# Patient Record
Sex: MALE | Race: WHITE | NOT HISPANIC OR LATINO | Employment: FULL TIME | URBAN - METROPOLITAN AREA
[De-identification: names, ages, dates, MRNs, and addresses within clinical notes are randomized per-mention and may not be internally consistent; named-entity substitution may affect disease eponyms.]

---

## 2020-07-16 ENCOUNTER — OFFICE VISIT (OUTPATIENT)
Dept: URGENT CARE | Facility: CLINIC | Age: 50
End: 2020-07-16
Payer: COMMERCIAL

## 2020-07-16 ENCOUNTER — APPOINTMENT (OUTPATIENT)
Dept: RADIOLOGY | Facility: CLINIC | Age: 50
End: 2020-07-16
Payer: COMMERCIAL

## 2020-07-16 VITALS
HEIGHT: 72 IN | OXYGEN SATURATION: 98 % | RESPIRATION RATE: 18 BRPM | SYSTOLIC BLOOD PRESSURE: 150 MMHG | TEMPERATURE: 98.5 F | DIASTOLIC BLOOD PRESSURE: 80 MMHG | HEART RATE: 81 BPM | BODY MASS INDEX: 32.51 KG/M2 | WEIGHT: 240 LBS

## 2020-07-16 DIAGNOSIS — M25.562 ACUTE PAIN OF LEFT KNEE: ICD-10-CM

## 2020-07-16 DIAGNOSIS — S80.812A ABRASION OF LEFT LOWER EXTREMITY, INITIAL ENCOUNTER: ICD-10-CM

## 2020-07-16 DIAGNOSIS — M25.572 ACUTE LEFT ANKLE PAIN: ICD-10-CM

## 2020-07-16 DIAGNOSIS — M25.572 ACUTE LEFT ANKLE PAIN: Primary | ICD-10-CM

## 2020-07-16 PROCEDURE — 73564 X-RAY EXAM KNEE 4 OR MORE: CPT

## 2020-07-16 PROCEDURE — 73610 X-RAY EXAM OF ANKLE: CPT

## 2020-07-16 PROCEDURE — G0383 LEV 4 HOSP TYPE B ED VISIT: HCPCS | Performed by: PHYSICIAN ASSISTANT

## 2020-07-16 RX ORDER — SULFAMETHOXAZOLE AND TRIMETHOPRIM 800; 160 MG/1; MG/1
1 TABLET ORAL EVERY 12 HOURS SCHEDULED
Qty: 14 TABLET | Refills: 0 | Status: SHIPPED | OUTPATIENT
Start: 2020-07-16 | End: 2020-07-23

## 2020-07-16 NOTE — PROGRESS NOTES
[facility]        NAME: Gregorio Bess is a 47 y o  male  : 1970    MRN: [id number]  DATE: 2020  TIME: 9:53 AM    Assessment and Plan   Acute left ankle pain [M25 572]  1  Acute left ankle pain  XR ankle 3+ vw left   2  Acute pain of left knee  XR knee 4+ vw left injury   3  Abrasion of left lower extremity, initial encounter  sulfamethoxazole-trimethoprim (BACTRIM DS) 800-160 mg per tablet    CANCELED: TDAP Vaccine greater than or equal to 6yo         Patient Instructions     Patient Instructions   -Xray of the left ankle shows a bony abnormality in which the radiologist recommends having an MRI with contrast of your leg  -Knee xray is negative  -take bactrim as directed to cover for possible wound infection  -F/U with PCP within 2-3 days for re-evaluation and further work-up/referral if needed  Patient was given a list of PCP's through [facility]  to ER with worsening symptoms or any signs of distress      Follow up with PCP in 3-5 days  Proceed to  ER if symptoms worsen  Chief Complaint     Chief Complaint   Patient presents with    Leg Injury     c/o of left leg injury since tuesday after pallet of cement blocks fell off trailer and onto left leg  History of Present Illness       Patient is a 50-year-old male who presents today for evaluation of an injury to his left leg  Patient states that the injury occurred on Tuesday while at work when pallet of cement blocks toppled off the back of a trailer and landed onto his left leg  The patient states that as it was happening he quickly turned and was running away however one of the cement blocks landed onto his left leg which caused him to fall to the ground  He states that that each block weighs about 80 pounds  The patient has abrasions on the outside of his left knee and his outer lower leg  The patient states that most of his pain is in his left ankle   He states that his ankle is swollen and he has pain with turning his ankle in and out  He rates his pain as a 9/10  The patient also admits to having pain to his left knee with certain movements and states that at times he gets a "jolting" pain that radiates up the back of his left thigh  The patient is unsure of his last tetanus shot  Review of Systems   Review of Systems   Constitutional: Negative for chills and fever  Musculoskeletal: Positive for joint swelling  Skin: Positive for wound  Neurological: Negative for weakness and numbness  All other systems reviewed and are negative  Current Medications       Current Outpatient Medications:     sulfamethoxazole-trimethoprim (BACTRIM DS) 800-160 mg per tablet, Take 1 tablet by mouth every 12 (twelve) hours for 7 days, Disp: 14 tablet, Rfl: 0    Current Allergies     Allergies as of 07/16/2020 - Reviewed 07/16/2020   Allergen Reaction Noted    Penicillins  07/16/2020            The following portions of the patient's history were reviewed and updated as appropriate: allergies, current medications, past family history, past medical history, past social history, past surgical history and problem list      History reviewed  No pertinent past medical history  History reviewed  No pertinent surgical history  Family History   Problem Relation Age of Onset    No Known Problems Mother     No Known Problems Father          Medications have been verified  Objective   /80   Pulse 81   Temp 98 5 °F (36 9 °C)   Resp 18   Ht 6' (1 829 m)   Wt 109 kg (240 lb)   SpO2 98%   BMI 32 55 kg/m²        Physical Exam     Physical Exam   Constitutional: He appears well-developed and well-nourished  No distress  Musculoskeletal:        Legs:       Feet:    Nursing note and vitals reviewed

## 2020-07-16 NOTE — PATIENT INSTRUCTIONS
-Xray of the left ankle shows a bony abnormality in which the radiologist recommends having an MRI with contrast of your leg  -Knee xray is negative  -take bactrim as directed to cover for possible wound infection  -F/U with PCP within 2-3 days for re-evaluation and further work-up/referral if needed   Patient was given a list of PCP's through [facility]  to ER with worsening symptoms or any signs of distress

## 2020-07-19 ENCOUNTER — TELEPHONE (OUTPATIENT)
Dept: URGENT CARE | Facility: CLINIC | Age: 50
End: 2020-07-19

## 2020-07-19 NOTE — TELEPHONE ENCOUNTER
I spoke with patient today and discussed with him that we never gave him a tetanus shot before leaving on Thursday  I offered for patient to come back for a nurse visit to get the immunization however he has an appt on Wednesday with a PCP and he states that he will discuss it with him

## 2020-07-22 ENCOUNTER — OFFICE VISIT (OUTPATIENT)
Dept: FAMILY MEDICINE CLINIC | Facility: CLINIC | Age: 50
End: 2020-07-22
Payer: COMMERCIAL

## 2020-07-22 VITALS
WEIGHT: 246.8 LBS | OXYGEN SATURATION: 99 % | BODY MASS INDEX: 33.43 KG/M2 | DIASTOLIC BLOOD PRESSURE: 86 MMHG | HEART RATE: 76 BPM | SYSTOLIC BLOOD PRESSURE: 158 MMHG | TEMPERATURE: 98.3 F | RESPIRATION RATE: 24 BRPM | HEIGHT: 72 IN

## 2020-07-22 DIAGNOSIS — Z12.5 SCREENING PSA (PROSTATE SPECIFIC ANTIGEN): ICD-10-CM

## 2020-07-22 DIAGNOSIS — E66.1 CLASS 1 DRUG-INDUCED OBESITY WITHOUT SERIOUS COMORBIDITY WITH BODY MASS INDEX (BMI) OF 33.0 TO 33.9 IN ADULT: Primary | ICD-10-CM

## 2020-07-22 DIAGNOSIS — S89.92XD INJURY OF LEFT LOWER EXTREMITY, SUBSEQUENT ENCOUNTER: ICD-10-CM

## 2020-07-22 DIAGNOSIS — Z12.11 ENCOUNTER FOR SCREENING COLONOSCOPY: ICD-10-CM

## 2020-07-22 PROBLEM — S89.92XA INJURY OF LEFT LEG: Status: ACTIVE | Noted: 2020-07-22

## 2020-07-22 PROCEDURE — 3008F BODY MASS INDEX DOCD: CPT | Performed by: NURSE PRACTITIONER

## 2020-07-22 PROCEDURE — 1036F TOBACCO NON-USER: CPT | Performed by: NURSE PRACTITIONER

## 2020-07-22 PROCEDURE — 99203 OFFICE O/P NEW LOW 30 MIN: CPT | Performed by: NURSE PRACTITIONER

## 2020-07-22 NOTE — ASSESSMENT & PLAN NOTE
Regular exercise and physical activity may help you control your weight  Diet and exercise play an important role in controlling your weight

## 2020-07-22 NOTE — PROGRESS NOTES
OFFICE VISIT  Joaquim Arzola 47 y o  male MRN: [id number]    BMI Counseling: Body mass index is 33 47 kg/m²  The BMI is above normal  Nutrition recommendations include decreasing portion sizes  Exercise recommendations include moderate physical activity 150 minutes/week  Assessment / Plan:  Problem List Items Addressed This Visit        Other    Class 1 drug-induced obesity without serious comorbidity with body mass index (BMI) of 33 0 to 33 9 in adult - Primary     Regular exercise and physical activity may help you control your weight  Diet and exercise play an important role in controlling your weight  Relevant Orders    CBC and differential    Comprehensive metabolic panel    Lipid Panel with Direct LDL reflex    Injury of left leg    Relevant Orders    Ambulatory referral to Occupational Medicine    MRI tibia fibula left w wo contrast    MRI ankle/heel left w wo contrast      Other Visit Diagnoses     Encounter for screening colonoscopy        Relevant Orders    Ambulatory referral to Gastroenterology    Screening PSA (prostate specific antigen)        Relevant Orders    PSA, total and free            Reason For Visit / Chief Complaint  Chief Complaint   Patient presents with   [facility]    Ankle Pain     work injury-7/14/20 pain 8/10        HPI:  Joaquim Arzola is a 47 y o  male who presents today to [facility]  He was last seen by Dr Elisabet Epps  He reports overall good health  He reports an injury at work that occurred on 7/16  He reports someone loading a trailer and the top pallet fell off the trailer, hitting him in his left leg/arm  He reported immediate pain that had gotten slightly better  He was seen by urgent care due to swelling and pain  He had an xray done at that time which was abnormal  He was recommended to get an MRI of left lower leg  He is still actively working, he is unsure what he is to do regarding his claim  Historical Information   History reviewed   No pertinent past medical history  History reviewed  No pertinent surgical history  Social History   Social History     Substance and Sexual Activity   Alcohol Use Not Currently     Social History     Substance and Sexual Activity   Drug Use Not Currently     Social History     Tobacco Use   Smoking Status Former Smoker   Smokeless Tobacco Never Used     Family History   Problem Relation Age of Onset    No Known Problems Mother     Other Father         COVID-19    No Known Problems Sister     No Known Problems Son     No Known Problems Daughter        Meds/Allergies   Allergies   Allergen Reactions    Penicillins        Meds:    Current Outpatient Medications:     sulfamethoxazole-trimethoprim (BACTRIM DS) 800-160 mg per tablet, Take 1 tablet by mouth every 12 (twelve) hours for 7 days, Disp: 14 tablet, Rfl: 0      REVIEW OF SYSTEMS  Review of Systems   Constitutional: Negative for appetite change, fatigue and fever  HENT: Negative for congestion, ear discharge, ear pain and postnasal drip  Eyes: Negative for pain, discharge, redness, itching and visual disturbance  Respiratory: Negative for chest tightness, shortness of breath and wheezing  Cardiovascular: Negative for chest pain, palpitations and leg swelling  Gastrointestinal: Negative for abdominal distention, abdominal pain, blood in stool, diarrhea, nausea and vomiting  Endocrine: Negative for cold intolerance, heat intolerance, polydipsia, polyphagia and polyuria  Genitourinary: Negative for decreased urine volume, difficulty urinating, dysuria, frequency, hematuria, testicular pain and urgency  Musculoskeletal: Negative for arthralgias, back pain, myalgias, neck pain and neck stiffness  Skin: Positive for wound  Negative for color change, pallor and rash  Neurological: Negative for dizziness, light-headedness, numbness and headaches  Hematological: Negative for adenopathy  Does not bruise/bleed easily     Psychiatric/Behavioral: Negative for agitation, behavioral problems, self-injury, sleep disturbance and suicidal ideas  The patient is not nervous/anxious  Current Vitals:   Blood Pressure: 158/86 (07/22/20 0815)  Pulse: 76 (07/22/20 0815)  Temperature: 98 3 °F (36 8 °C) (07/22/20 0815)  Temp Source: Tympanic (07/22/20 0815)  Respirations: (!) 24 (07/22/20 0815)  Height: 6' (182 9 cm) (07/22/20 0815)  Weight - Scale: 112 kg (246 lb 12 8 oz) (07/22/20 0815)  SpO2: 99 % (07/22/20 0815)  [unfilled]    PHYSICAL EXAMS:  Physical Exam   Constitutional: He is oriented to person, place, and time  He appears well-developed and well-nourished  HENT:   Head: Normocephalic and atraumatic  Right Ear: External ear normal    Left Ear: External ear normal    Nose: Nose normal    Mouth/Throat: Oropharynx is clear and moist    Eyes: Pupils are equal, round, and reactive to light  Conjunctivae are normal  Right eye exhibits no discharge  Left eye exhibits no discharge  Neck: Normal range of motion  Neck supple  No thyromegaly present  Cardiovascular: Normal rate, regular rhythm and normal heart sounds  Pulmonary/Chest: Effort normal and breath sounds normal    Abdominal: Soft  Bowel sounds are normal  He exhibits no distension  There is no tenderness  Musculoskeletal: Normal range of motion  He exhibits no edema, tenderness or deformity  Neurological: He is alert and oriented to person, place, and time  Skin: Skin is warm and dry  No rash noted  No erythema  Abrasion to left lower leg and elbow  Psychiatric: He has a normal mood and affect  His behavior is normal            Lab, imaging and other studies: I have personally reviewed pertinent reports  Alina Fowler

## 2021-07-19 ENCOUNTER — VBI (OUTPATIENT)
Dept: ADMINISTRATIVE | Facility: OTHER | Age: 51
End: 2021-07-19

## 2023-05-13 ENCOUNTER — OFFICE VISIT (OUTPATIENT)
Dept: URGENT CARE | Facility: CLINIC | Age: 53
End: 2023-05-13

## 2023-05-13 VITALS
SYSTOLIC BLOOD PRESSURE: 140 MMHG | HEIGHT: 71 IN | OXYGEN SATURATION: 97 % | DIASTOLIC BLOOD PRESSURE: 90 MMHG | WEIGHT: 247 LBS | HEART RATE: 67 BPM | TEMPERATURE: 98 F | RESPIRATION RATE: 18 BRPM | BODY MASS INDEX: 34.58 KG/M2

## 2023-05-13 DIAGNOSIS — H10.9 CONJUNCTIVITIS OF BOTH EYES, UNSPECIFIED CONJUNCTIVITIS TYPE: Primary | ICD-10-CM

## 2023-05-13 DIAGNOSIS — L03.213 PERIORBITAL CELLULITIS OF LEFT EYE: ICD-10-CM

## 2023-05-13 RX ORDER — DOXAZOSIN 2 MG/1
2 TABLET ORAL
COMMUNITY
Start: 2022-12-12 | End: 2023-12-12

## 2023-05-13 RX ORDER — APIXABAN 5 MG/1
5 TABLET, FILM COATED ORAL 2 TIMES DAILY
COMMUNITY
Start: 2023-04-30

## 2023-05-13 RX ORDER — CLINDAMYCIN HYDROCHLORIDE 300 MG/1
600 CAPSULE ORAL 3 TIMES DAILY
Qty: 60 CAPSULE | Refills: 0 | Status: SHIPPED | OUTPATIENT
Start: 2023-05-13 | End: 2023-05-23

## 2023-05-13 RX ORDER — CHLORTHALIDONE 25 MG/1
TABLET ORAL
COMMUNITY
Start: 2023-04-20

## 2023-05-13 RX ORDER — AMLODIPINE BESYLATE AND BENAZEPRIL HYDROCHLORIDE 10; 40 MG/1; MG/1
CAPSULE ORAL
COMMUNITY
Start: 2023-05-10

## 2023-05-13 RX ORDER — POLYMYXIN B SULFATE AND TRIMETHOPRIM 1; 10000 MG/ML; [USP'U]/ML
1 SOLUTION OPHTHALMIC EVERY 4 HOURS
Qty: 10 ML | Refills: 0 | Status: SHIPPED | OUTPATIENT
Start: 2023-05-13

## 2023-05-13 RX ORDER — METOPROLOL TARTRATE 50 MG/1
50 TABLET, FILM COATED ORAL 2 TIMES DAILY
COMMUNITY
Start: 2023-04-20

## 2023-05-13 NOTE — PROGRESS NOTES
[facility]        NAME: Oscar Hollis is a 47 y o  male  : 1970    MRN: [id number]  DATE: May 13, 2023  TIME: 12:42 PM    Assessment and Plan   Conjunctivitis of both eyes, unspecified conjunctivitis type [H10 9]  1  Conjunctivitis of both eyes, unspecified conjunctivitis type  polymyxin b-trimethoprim (POLYTRIM) ophthalmic solution      2  Periorbital cellulitis of left eye  clindamycin (CLEOCIN) 300 MG capsule            Patient Instructions   Will cover patient for conjunctivitis as well as periorbital cellulitis  Discussed with patient low threshold to go to the ER increased swelling, redness, fevers, eye pain, inability to move the eye  Patient and wife understand  Use Polytrim as prescribed   Take clindamycin as perscribed  Take over the counter Claritin  Apply cold compresses  Avoid touching eyes  Wash hands frequently  Change pillowcases daily  Follow up with ophthalmologist if symptoms do not resolve  Follow up with PCP in 3-5 days  Proceed to  ER if symptoms worsen  Chief Complaint     Chief Complaint   Patient presents with   • Eye Pain     Left eye, Itchy, red and puffy, Crusted closed  Hot compresses  History of Present Illness       HPI  This is a 47year old male here c/o left eye swelling since Thursday  Patient denies, eye pain or itching  Denies change in vision  Notes he has watering of the eye throughout the day and when he wakes up it is crusted shut  Denies fever, chills, n/v/d, shortness of breath or chest pain  Notes there swollen area around the eye is tender but he has been rubbing it a lot  Review of Systems   Review of Systems   Constitutional: Negative for chills and fever  Eyes: Positive for discharge and redness  Negative for photophobia, pain, itching and visual disturbance  Respiratory: Negative for cough and shortness of breath  Cardiovascular: Negative for chest pain  Gastrointestinal: Negative for diarrhea, nausea and vomiting  "        Current Medications       Current Outpatient Medications:   •  amLODIPine-benazepril (LOTREL) 10-40 MG per capsule, , Disp: , Rfl:   •  Ascorbic Acid 500 MG CHEW, Chew 500 mg daily, Disp: , Rfl:   •  chlorthalidone 25 mg tablet, TAKE 1 TABLET (25 MG TOTAL) BY MOUTH DAILY  , Disp: , Rfl:   •  Cholecalciferol 125 MCG (5000 UT) TABS, Take by mouth daily, Disp: , Rfl:   •  clindamycin (CLEOCIN) 300 MG capsule, Take 2 capsules (600 mg total) by mouth 3 (three) times a day for 10 days, Disp: 60 capsule, Rfl: 0  •  doxazosin (CARDURA) 2 mg tablet, Take 2 mg by mouth, Disp: , Rfl:   •  Eliquis 5 MG, Take 5 mg by mouth 2 (two) times a day, Disp: , Rfl:   •  metoprolol tartrate (LOPRESSOR) 50 mg tablet, Take 50 mg by mouth 2 (two) times a day, Disp: , Rfl:   •  polymyxin b-trimethoprim (POLYTRIM) ophthalmic solution, Administer 1 drop to both eyes every 4 (four) hours, Disp: 10 mL, Rfl: 0    Current Allergies     Allergies as of 05/13/2023 - Reviewed 05/13/2023   Allergen Reaction Noted   • Penicillins  07/16/2020            The following portions of the patient's history were reviewed and updated as appropriate: allergies, current medications, past family history, past medical history, past social history, past surgical history and problem list      Past Medical History:   Diagnosis Date   • Atrial fibrillation ([address] )    • Hypertension        Past Surgical History:   Procedure Laterality Date   • NO PAST SURGERIES         Family History   Problem Relation Age of Onset   • No Known Problems Mother    • Other Father         COVID-19   • No Known Problems Sister    • No Known Problems Son    • No Known Problems Daughter          Medications have been verified  Objective   /90   Pulse 67   Temp 98 °F (36 7 °C)   Resp 18   Ht 5' 11\" (1 803 m)   Wt 112 kg (247 lb)   SpO2 97%   BMI 34 45 kg/m²        Physical Exam     Physical Exam  Vitals and nursing note reviewed     Constitutional:       General: He is " not in acute distress  Appearance: Normal appearance  He is not ill-appearing or toxic-appearing  Eyes:      General: Lids are everted, no foreign bodies appreciated  Right eye: No foreign body, discharge or hordeolum  Left eye: No foreign body, discharge or hordeolum  Extraocular Movements: Extraocular movements intact  Right eye: Normal extraocular motion  Left eye: Normal extraocular motion  Conjunctiva/sclera:      Right eye: Right conjunctiva is injected  No exudate  Left eye: Left conjunctiva is injected  No exudate  Comments: Swelling of the lower eye lid  There appears to be a possible style below the the eyelid border  See attached pictures  Cardiovascular:      Rate and Rhythm: Normal rate and regular rhythm  Pulmonary:      Effort: Pulmonary effort is normal       Breath sounds: Normal breath sounds  Neurological:      Mental Status: He is alert

## 2023-07-12 PROBLEM — H10.9 CONJUNCTIVITIS OF BOTH EYES: Status: RESOLVED | Noted: 2023-05-13 | Resolved: 2023-07-12

## 2023-10-03 ENCOUNTER — OFFICE VISIT (OUTPATIENT)
Dept: URGENT CARE | Facility: MEDICAL CENTER | Age: 53
End: 2023-10-03
Payer: COMMERCIAL

## 2023-10-03 VITALS
WEIGHT: 257 LBS | TEMPERATURE: 99.1 F | DIASTOLIC BLOOD PRESSURE: 87 MMHG | HEIGHT: 72 IN | SYSTOLIC BLOOD PRESSURE: 143 MMHG | BODY MASS INDEX: 34.81 KG/M2 | RESPIRATION RATE: 18 BRPM | OXYGEN SATURATION: 97 % | HEART RATE: 80 BPM

## 2023-10-03 DIAGNOSIS — H60.502 ACUTE OTITIS EXTERNA OF LEFT EAR, UNSPECIFIED TYPE: Primary | ICD-10-CM

## 2023-10-03 PROCEDURE — 99213 OFFICE O/P EST LOW 20 MIN: CPT | Performed by: PHYSICIAN ASSISTANT

## 2023-10-03 RX ORDER — OFLOXACIN 3 MG/ML
5 SOLUTION AURICULAR (OTIC) 2 TIMES DAILY
Qty: 5 ML | Refills: 0 | Status: SHIPPED | OUTPATIENT
Start: 2023-10-03

## 2023-10-03 RX ORDER — AZITHROMYCIN 250 MG/1
TABLET, FILM COATED ORAL
Qty: 6 TABLET | Refills: 0 | Status: SHIPPED | OUTPATIENT
Start: 2023-10-03 | End: 2023-10-07

## 2023-10-03 NOTE — PATIENT INSTRUCTIONS
Otitis externa  Floxin otic as directed  Follow up with PCP in 3-5 days.   Proceed to  ER if symptoms worsen

## 2023-10-03 NOTE — LETTER
October 3, 2023     Patient: Manoj Jean   YOB: 1970   Date of Visit: 10/3/2023       To Whom it May Concern:    Manoj Jean was seen in my clinic on 10/3/2023. He may return to work on 10/6/23. If you have any questions or concerns, please don't hesitate to call.          Sincerely,          [facility]        CC: No Recipients

## 2023-10-03 NOTE — PROGRESS NOTES
[facility]        NAME: Vikas Juárez is a 47 y.o. male  : 1970    MRN: [id number]  DATE: October 3, 2023  TIME: 5:12 PM    Assessment and Plan   Acute otitis externa of left ear, unspecified type [H60.502]  1. Acute otitis externa of left ear, unspecified type  ofloxacin (FLOXIN) 0.3 % otic solution    azithromycin (ZITHROMAX) 250 mg tablet            Patient Instructions     Otitis externa  Floxin otic as directed  Follow up with PCP in 3-5 days. Proceed to  ER if symptoms worsen. Chief Complaint     Chief Complaint   Patient presents with   • Earache     Pt. C/O left ear pain for the past day         History of Present Illness       59-year-old male who presents complaining of left ear pain. Patient states that he had like a little pimple on the edge and he started cleaning it and irrigating it and now it is inflamed and he cannot hear out of it. Denies fevers, cough, congestion. Patient also c/o having a stye for which he is already putting drops in (antibiotic he had from previous episodes)      Review of Systems   Review of Systems   Constitutional: Negative. HENT: Positive for ear pain. Eyes: Negative. Respiratory: Negative. Negative for apnea, cough, choking, chest tightness, shortness of breath, wheezing and stridor. Cardiovascular: Negative. Negative for chest pain. Current Medications       Current Outpatient Medications:   •  amLODIPine-benazepril (LOTREL) 10-40 MG per capsule, , Disp: , Rfl:   •  Ascorbic Acid 500 MG CHEW, Chew 500 mg daily, Disp: , Rfl:   •  azithromycin (ZITHROMAX) 250 mg tablet, Take 2 tablets today then 1 tablet daily x 4 days, Disp: 6 tablet, Rfl: 0  •  chlorthalidone 25 mg tablet, TAKE 1 TABLET (25 MG TOTAL) BY MOUTH DAILY. , Disp: , Rfl:   •  doxazosin (CARDURA) 2 mg tablet, Take 2 mg by mouth, Disp: , Rfl:   •  Eliquis 5 MG, Take 5 mg by mouth 2 (two) times a day, Disp: , Rfl:   •  metoprolol tartrate (LOPRESSOR) 50 mg tablet, Take 50 mg by mouth 2 (two) times a day, Disp: , Rfl:   •  ofloxacin (FLOXIN) 0.3 % otic solution, Administer 5 drops into the left ear 2 (two) times a day, Disp: 5 mL, Rfl: 0  •  polymyxin b-trimethoprim (POLYTRIM) ophthalmic solution, Administer 1 drop to both eyes every 4 (four) hours, Disp: 10 mL, Rfl: 0  •  Cholecalciferol 125 MCG (5000 UT) TABS, Take by mouth daily (Patient not taking: Reported on 10/3/2023), Disp: , Rfl:   •  Multiple Vitamin (MULTIVITAMIN PO), Take 2 tablets by mouth daily, Disp: , Rfl:     Current Allergies     Allergies as of 10/03/2023 - Reviewed 10/03/2023   Allergen Reaction Noted   • Penicillins  07/16/2020            The following portions of the patient's history were reviewed and updated as appropriate: allergies, current medications, past family history, past medical history, past social history, past surgical history and problem list.     Past Medical History:   Diagnosis Date   • Atrial fibrillation ([address])    • Hypertension        Past Surgical History:   Procedure Laterality Date   • NO PAST SURGERIES         Family History   Problem Relation Age of Onset   • No Known Problems Mother    • Other Father         COVID-19   • No Known Problems Sister    • No Known Problems Son    • No Known Problems Daughter          Medications have been verified. Objective   /87   Pulse 80   Temp 99.1 °F (37.3 °C)   Resp 18   Ht 6' (1.829 m)   Wt 117 kg (257 lb)   SpO2 97%   BMI 34.86 kg/m²        Physical Exam     Physical Exam  Constitutional:       General: He is not in acute distress. Appearance: He is well-developed. He is not diaphoretic. HENT:      Head: Normocephalic and atraumatic. Right Ear: Hearing, tympanic membrane, ear canal and external ear normal.      Left Ear: Hearing and external ear normal. Swelling and tenderness present. Tympanic membrane is erythematous. Cardiovascular:      Rate and Rhythm: Normal rate and regular rhythm.       Heart sounds: Normal heart sounds. Pulmonary:      Effort: Pulmonary effort is normal. No respiratory distress. Breath sounds: Normal breath sounds. No wheezing or rales. Chest:      Chest wall: No tenderness. Musculoskeletal:      Cervical back: Normal range of motion and neck supple. Lymphadenopathy:      Cervical: No cervical adenopathy.

## 2025-04-12 ENCOUNTER — APPOINTMENT (OUTPATIENT)
Dept: URGENT CARE | Facility: MEDICAL CENTER | Age: 55
End: 2025-04-12